# Patient Record
Sex: MALE | Race: WHITE
[De-identification: names, ages, dates, MRNs, and addresses within clinical notes are randomized per-mention and may not be internally consistent; named-entity substitution may affect disease eponyms.]

---

## 2018-02-04 ENCOUNTER — HOSPITAL ENCOUNTER (EMERGENCY)
Dept: HOSPITAL 62 - ER | Age: 48
Discharge: HOME | End: 2018-02-04
Payer: MEDICARE

## 2018-02-04 VITALS — SYSTOLIC BLOOD PRESSURE: 132 MMHG | DIASTOLIC BLOOD PRESSURE: 101 MMHG

## 2018-02-04 VITALS — DIASTOLIC BLOOD PRESSURE: 66 MMHG | SYSTOLIC BLOOD PRESSURE: 131 MMHG

## 2018-02-04 DIAGNOSIS — M25.551: ICD-10-CM

## 2018-02-04 DIAGNOSIS — J40: ICD-10-CM

## 2018-02-04 DIAGNOSIS — S80.812A: Primary | ICD-10-CM
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
## 2018-02-04 DIAGNOSIS — S99.922A: Primary | ICD-10-CM

## 2018-02-04 DIAGNOSIS — Z79.899: ICD-10-CM

## 2018-02-04 DIAGNOSIS — R07.9: ICD-10-CM

## 2018-02-04 DIAGNOSIS — R06.02: ICD-10-CM

## 2018-02-04 DIAGNOSIS — Z23: ICD-10-CM
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
## 2018-02-04 DIAGNOSIS — F17.200: ICD-10-CM

## 2018-02-04 DIAGNOSIS — R07.89: ICD-10-CM

## 2018-02-04 DIAGNOSIS — S80.812A: ICD-10-CM

## 2018-02-04 DIAGNOSIS — S99.922A: ICD-10-CM

## 2018-02-04 PROCEDURE — 99284 EMERGENCY DEPT VISIT MOD MDM: CPT

## 2018-02-04 PROCEDURE — 94640 AIRWAY INHALATION TREATMENT: CPT

## 2018-02-04 PROCEDURE — 70450 CT HEAD/BRAIN W/O DYE: CPT

## 2018-02-04 PROCEDURE — 73660 X-RAY EXAM OF TOE(S): CPT

## 2018-02-04 PROCEDURE — 73502 X-RAY EXAM HIP UNI 2-3 VIEWS: CPT

## 2018-02-04 PROCEDURE — 90471 IMMUNIZATION ADMIN: CPT

## 2018-02-04 PROCEDURE — 90715 TDAP VACCINE 7 YRS/> IM: CPT

## 2018-02-04 PROCEDURE — 71101 X-RAY EXAM UNILAT RIBS/CHEST: CPT
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
